# Patient Record
(demographics unavailable — no encounter records)

---

## 2024-12-16 NOTE — HISTORY OF PRESENT ILLNESS
[de-identified] : Her Mother's side of the family has a lot of hearing loss; she's been noticing some trouble hearing worse on the left since 2023. No tinnitus. Denies: ear pain, drainage, frequent loud noise exp/shooting, frequent infections, hx of ear surgery or IV antibiotics/chemo. Qtip use: yes

## 2024-12-16 NOTE — ASSESSMENT
[FreeTextEntry1] : RTC annually for audios, sooner as needed for worsening hearing or tinnitus.  We discussed the importance not putting qtips or other foreign objects in the ears.

## 2024-12-16 NOTE — PHYSICAL EXAM
[Binocular Microscopic Exam] : Binocular microscopic exam was performed [FreeTextEntry8] : Obstructing cerumen was removed with a hook [FreeTextEntry9] : Obstructing cerumen was removed with a hook [Removed] : palatine tonsils previously removed [Normal] : no masses and lesions seen, face is symmetric